# Patient Record
Sex: FEMALE | Race: WHITE | Employment: OTHER | URBAN - METROPOLITAN AREA
[De-identification: names, ages, dates, MRNs, and addresses within clinical notes are randomized per-mention and may not be internally consistent; named-entity substitution may affect disease eponyms.]

---

## 2017-10-25 ENCOUNTER — OFFICE VISIT (OUTPATIENT)
Dept: ORTHOPEDIC SURGERY | Facility: CLINIC | Age: 67
End: 2017-10-25

## 2017-10-25 DIAGNOSIS — M54.16 LUMBAR RADICULOPATHY, CHRONIC: ICD-10-CM

## 2017-10-25 DIAGNOSIS — M16.0 PRIMARY OSTEOARTHRITIS OF BOTH HIPS: Primary | ICD-10-CM

## 2017-10-25 PROCEDURE — 99204 OFFICE O/P NEW MOD 45 MIN: CPT | Performed by: ORTHOPAEDIC SURGERY

## 2017-10-25 RX ORDER — TOPIRAMATE 50 MG/1
TABLET, FILM COATED ORAL
Refills: 2 | COMMUNITY
Start: 2017-10-06

## 2017-11-03 PROBLEM — M54.16 LUMBAR RADICULOPATHY, CHRONIC: Status: ACTIVE | Noted: 2017-11-03

## 2017-11-03 PROBLEM — M16.0 PRIMARY OSTEOARTHRITIS OF BOTH HIPS: Status: ACTIVE | Noted: 2017-11-03

## 2018-01-05 ENCOUNTER — TELEPHONE (OUTPATIENT)
Dept: ORTHOPEDIC SURGERY | Facility: CLINIC | Age: 68
End: 2018-01-05

## 2018-05-23 ENCOUNTER — OFFICE VISIT (OUTPATIENT)
Dept: ORTHOPEDIC SURGERY | Facility: CLINIC | Age: 68
End: 2018-05-23

## 2018-05-23 VITALS — TEMPERATURE: 97.4 F | BODY MASS INDEX: 37.99 KG/M2 | WEIGHT: 228 LBS | HEIGHT: 65 IN

## 2018-05-23 DIAGNOSIS — M54.16 LUMBAR RADICULOPATHY, CHRONIC: ICD-10-CM

## 2018-05-23 DIAGNOSIS — I82.5Y2 CHRONIC DEEP VEIN THROMBOSIS (DVT) OF PROXIMAL VEIN OF LEFT LOWER EXTREMITY (HCC): ICD-10-CM

## 2018-05-23 DIAGNOSIS — M16.0 PRIMARY OSTEOARTHRITIS OF BOTH HIPS: ICD-10-CM

## 2018-05-23 DIAGNOSIS — M25.551 HIP PAIN, RIGHT: Primary | ICD-10-CM

## 2018-05-23 PROCEDURE — 73502 X-RAY EXAM HIP UNI 2-3 VIEWS: CPT | Performed by: ORTHOPAEDIC SURGERY

## 2018-05-23 PROCEDURE — 99215 OFFICE O/P EST HI 40 MIN: CPT | Performed by: ORTHOPAEDIC SURGERY

## 2018-05-23 RX ORDER — SODIUM PHOSPHATE,MONO-DIBASIC 19G-7G/118
ENEMA (ML) RECTAL
COMMUNITY

## 2018-05-23 RX ORDER — ASPIRIN 81 MG/1
81 TABLET ORAL DAILY
COMMUNITY

## 2018-05-23 RX ORDER — FOLIC ACID 1 MG/1
1 TABLET ORAL DAILY
COMMUNITY

## 2018-05-23 RX ORDER — MULTIPLE VITAMINS W/ MINERALS TAB 9MG-400MCG
1 TAB ORAL DAILY
COMMUNITY

## 2018-05-23 RX ORDER — DORZOLAMIDE HYDROCHLORIDE AND TIMOLOL MALEATE 20; 5 MG/ML; MG/ML
SOLUTION/ DROPS OPHTHALMIC
COMMUNITY
Start: 2018-05-10

## 2018-05-23 RX ORDER — APIXABAN 5 MG/1
2.5 TABLET, FILM COATED ORAL
COMMUNITY
Start: 2018-03-12

## 2018-05-23 RX ORDER — MONTELUKAST SODIUM 10 MG/1
TABLET ORAL
COMMUNITY
Start: 2018-03-12

## 2018-05-23 RX ORDER — LISINOPRIL 20 MG/1
TABLET ORAL
COMMUNITY
Start: 2018-03-24

## 2018-05-23 RX ORDER — ATORVASTATIN CALCIUM 40 MG/1
TABLET, FILM COATED ORAL
COMMUNITY
Start: 2018-03-12

## 2018-05-23 RX ORDER — CHLORAL HYDRATE 500 MG
CAPSULE ORAL
COMMUNITY

## 2018-05-23 NOTE — PROGRESS NOTES
Chief Complaint   Patient presents with   • Right Hip - Follow-up           HPI Patient is here to follow up on right hip and discuss possible surgery.She has progressively worse hip pain on the right side. She was scheduled for a total hip surgery in Jan 2018 but 1 week prior to that she developed a DVT and a Pulmonary embolism. She was airlifted to [facility] and has been seen by a Pulmonologist, DR Baires.  Other than inactivity and obesity, no real risk factors could be attributed to why she developed a pulmonary embolism preoperatively She has been anticoagulated with Eliquus for a duration of 6 months. She has been told by her Specialist That she can quit the anticoagulation in the first week of July and then she can actually undergo the hip replacement surgery.  The patient and her  are both present in the office today and we have had an extensive discussion about the risks and benefits of hip replacement surgery in the face of a prior DVT.        Vitals:    05/23/18 1342   Temp: 97.4 °F (36.3 °C)           Review of Systems   Constitutional: Negative.    HENT: Negative.    Eyes: Negative.    Respiratory: Negative.    Cardiovascular: Negative.    Gastrointestinal: Negative.    Endocrine: Negative.    Genitourinary: Negative.    Musculoskeletal: Positive for gait problem and joint swelling.   Skin: Negative.    Allergic/Immunologic: Negative.    Hematological: Negative.    Psychiatric/Behavioral: Negative.            Physical Exam   Constitutional: She is oriented to person, place, and time. She appears well-nourished.   HENT:   Head: Atraumatic.   Eyes: EOM are normal.   Neck: Neck supple.   Cardiovascular: Normal rate, regular rhythm, normal heart sounds and intact distal pulses.    Pulmonary/Chest: Breath sounds normal.   Abdominal: Bowel sounds are normal.   Musculoskeletal: She exhibits edema and tenderness.   Neurological: She is alert and oriented to person, place, and time.   Skin: Skin is  warm. Capillary refill takes 2 to 3 seconds.   Psychiatric: She has a normal mood and affect. Her behavior is normal. Thought content normal.   Nursing note and vitals reviewed.          Joint/Body Part Specific Exam:  right hip. Neurovascular status is intact. Patient has a distant limp on the affected side. Internal and external rotations are associated with pain and discomfort. Anterior joint line pain and tenderness is significant. Stinchfield sign is positive. Figure of 4 sign is positive. Patient is unable to perform an active straight leg raise exam. Greater trochanter is tender. Crossover adduction test is positive. Cross body adduction is limited and painful for the patient. Patient has very significant limp and joint line tenderness anteriorly, posteriorly and medially. Dorsalis pedis and posterior tibial artery pulses are palpable. Common peroneal nerve function is well preserved.  Left Lower Extremity:  Patient has a significant amount of swelling of the extremity. Homans sign is positive. Distal pulses are palpable. Passive stretching of the ankle bothers the patient significantly. There is a lot of swelling and edema posteriorly as well as anteriorly. Patient has some pretibial edema as well. Moving the fingers slowly, albeit fully. There is a deep seated tenderness on palpation of the gastroc when the patient performs a single heel raise. There is a lot of reproduction of the pain. Posterior tibial artery pulses are palpable. Common peroneal nerve function is well preserved.     Lumbar Spine: The overlying skin and soft tissues are mildly swollen. Deep tendon reflexes are bilaterally, symmetric and equal.  No long tract signs are noted. There is No evidence of myelopathy. No bowel or bladder deficit noted. Mild spasm of erector spinae muscle is noted. bilaterally sided rotation is associated with pain and discomfort. Straight leg raise test is positive to 60 degrees. Contralateral straight leg raise  is negative. Pain radiates to buttock and thigh. Get up and go is slow due to the lumbar pain.No objective motor or sensory function loss is noted.   X-RAY Report:  Pelvis X-Ray  Indication: Pain on the right hip with a limp  AP and Frogleg views  Findings: Advanced DJD of the right hip with complete loss of joint space  no bony lesion  Soft tissues within normal limits  decreased joint spaces  Hardware appropriately positioned not applicable      yes prior studies available for comparison.    X-RAY was ordered and reviewed by Sae Hunt MD          Diagnostics:        Nguyen was seen today for follow-up.    Diagnoses and all orders for this visit:    Hip pain, right  -     XR Hip With or Without Pelvis 2 - 3 View Right            Procedures        Plan:  Very extensive discussion with this patient about different treatment options especially with regards to increasing hip pain and limited ability to exercise and lose weight.    This is a very complex situation and I spent 60 minutes in the office today with the patient and her  going through the reports of the pulmonologist and discussing the pros and cons of surgical intervention.  Greater than 50% of my time was spent face-to-face with the patient discussing the management perioperatively to minimize her complications with regards to DVT.    My recommendations would be for this patient to see Dr. Chino Cloud, vascular intervention surgeon at [facility], [place], operated by [facility], and proceed with a vena cava filter 1-2 weeks prior to the hip replacement surgery to decrease the possibility of him bothers him.    We will also have her switched off from her blood thinner about 2 weeks prior to the surgical intervention.    Risks and benefits of total hip arthroplasty has been discussed with the patient in great detail and she understands and accepts.    The patient was seen today for preoperative discussion.  The patient has been tried on over-the-counter and prescription NSAID's  despite the risks of anti-inflammatory bleeding, peptic ulcers and erosive gastritis with short term benefit only.  Braces have been prescribed for mechanical support.  Patient has been participating in an exercise program specifically targeting joint pain relief with limited benefit. Intraarticular injections have been used periodically with some but not complete relief of pain.  Ambulation aids have also been utilized.      The details of the surgical procedure were explained including the location of probable incisions and a description of the likely hardware/grafts to be used. The patient understands the likely convalescence after surgery as well as the rehabilitation required.  Also, we have thoroughly discussed with the patient the risks, benefits and alternatives to surgery.  Risks include but are not limited to the risk of infection, joint stiffness, limited range of motion, wound healing problems, scar tissue build up, myocardial infarction, stroke, blood clots (including DVT and/or pulmonary embolus along with the risk of death) neurologic and/or vascular injury, limb length discrepancy, fracture, dislocation, nonunion, malunion, continued pain and need for further surgery including hardware failure requiring revision.

## 2018-06-04 ENCOUNTER — TELEPHONE (OUTPATIENT)
Dept: ORTHOPEDIC SURGERY | Facility: CLINIC | Age: 68
End: 2018-06-04

## 2018-06-15 DIAGNOSIS — M25.551 HIP PAIN, RIGHT: ICD-10-CM

## 2018-07-09 ENCOUNTER — OUTSIDE FACILITY SERVICE (OUTPATIENT)
Dept: ORTHOPEDIC SURGERY | Facility: CLINIC | Age: 68
End: 2018-07-09

## 2018-07-09 PROCEDURE — 27130 TOTAL HIP ARTHROPLASTY: CPT | Performed by: ORTHOPAEDIC SURGERY

## 2018-07-24 ENCOUNTER — OFFICE VISIT (OUTPATIENT)
Dept: ORTHOPEDIC SURGERY | Facility: CLINIC | Age: 68
End: 2018-07-24

## 2018-07-24 DIAGNOSIS — Z96.641 STATUS POST TOTAL HIP REPLACEMENT, RIGHT: ICD-10-CM

## 2018-07-24 DIAGNOSIS — I82.5Y2 CHRONIC DEEP VEIN THROMBOSIS (DVT) OF PROXIMAL VEIN OF LEFT LOWER EXTREMITY (HCC): ICD-10-CM

## 2018-07-24 DIAGNOSIS — I26.99 OTHER PULMONARY EMBOLISM WITHOUT ACUTE COR PULMONALE, UNSPECIFIED CHRONICITY (HCC): Primary | ICD-10-CM

## 2018-07-24 PROCEDURE — 99024 POSTOP FOLLOW-UP VISIT: CPT | Performed by: ORTHOPAEDIC SURGERY

## 2018-07-24 NOTE — PROGRESS NOTES
Chief Complaint   Patient presents with   • Right Hip - Post-op   • Wound Check         HPI the patient is here today for a follow up of her total right hip arthroplasty on 07/09/18.  She is doing extremely well at this point postoperatively.  The sharp, crunching pain or hip has fully resolved.  She states that her ambulation has improved significantly.  She is not using a walker at this point.  She does use a cane for assistance with ambulation.  She is very pleased with her outcome.  There is no limb length discrepancy.  There is no neurovascular deficit.  We have discussed with the patient that it is okay for her to start driving at this point.  We will get a consultation from a hematologist at [facility] to determine the exact length of anticoagulation following the hip replacement given the fact that she has had a pulmonary embolism earlier this year.  She will be seen by the vascular surgeon for the postoperative care of the IVC filter that was placed prophylactically.  Overall she is very pleased with the outcome with the hip arthroplasty in terms of relief of pain and improvement in quality of life        There were no vitals filed for this visit.        Joint/Body Part Specific Exam:  right hip. Patient is post op 2 week(s) from total hip arthoplasty, direct anterior. Incision is clean and healing well. Calf is soft and nontender. Homans sign is negative. Skin and soft tissues are appropriate for postoperative status of the patient. Hip flexion is 0-60° degrees, hip abduction is 0-to 30° degrees. No limb lenth discrepancy. Neurovascular status is intact. Patients gait is significantly improved. The pain relief is extremely dramatic for the patient. Dorsalis pedis and posterior tibial artery pulses palpable. Common peroneal function is well preserved. There is no evidence of cellulitis or erythema or deep seated joint infection.      X-RAY REPORT:        Nguyen was seen today for wound check and  post-op.    Diagnoses and all orders for this visit:    Other pulmonary embolism without acute cor pulmonale, unspecified chronicity (CMS/HCC)  -     Ambulatory Referral to Hematology / Oncology    Chronic deep vein thrombosis (DVT) of proximal vein of left lower extremity (CMS/HCC)    Status post total hip replacement, right            Procedures        Instructions:      Plan: Incision care.    Continue with physical therapy on an outpatient basis to follow the total hip arthroplasty protocols.    Falls and dislocation precaution.    Use a cane for assistance with ambulation.    She is not using any narcotics for pain control at this point although she does have some leftover pills from a prior post operatively written prescription.    Tablet ibuprofen 600 mg orally twice a day for pain and discomfort.    Patient is on Eliquus for DVT prophylaxis.    I am setting up the patient an appointment with [facility] to see a hematologist to determine the length of anticoagulation that they would recommend given the history of a spontaneous pulmonary embolism that the patient suffered while she was preoperatively being prepared for the hip arthroplasty.    Patient will see Dr. Chino Cloud, vascular surgeon in August for removal of the IVC filter which was placed preoperatively to minimize the possibility of pulmonary embolism.    Follow-up in my office in 6-8 weeks for reevaluation.  We will get an x-ray of the hip arthroplasty upon return to the office.

## 2018-08-13 ENCOUNTER — CONSULT (OUTPATIENT)
Dept: ONCOLOGY | Facility: CLINIC | Age: 68
End: 2018-08-13

## 2018-08-13 ENCOUNTER — APPOINTMENT (OUTPATIENT)
Dept: LAB | Facility: HOSPITAL | Age: 68
End: 2018-08-13

## 2018-08-13 VITALS
BODY MASS INDEX: 38.28 KG/M2 | TEMPERATURE: 97.8 F | OXYGEN SATURATION: 98 % | RESPIRATION RATE: 18 BRPM | WEIGHT: 224.2 LBS | DIASTOLIC BLOOD PRESSURE: 76 MMHG | HEART RATE: 69 BPM | HEIGHT: 64 IN | SYSTOLIC BLOOD PRESSURE: 132 MMHG

## 2018-08-13 DIAGNOSIS — I82.5Y2 CHRONIC DEEP VEIN THROMBOSIS (DVT) OF PROXIMAL VEIN OF LEFT LOWER EXTREMITY (HCC): Primary | ICD-10-CM

## 2018-08-13 DIAGNOSIS — I26.99 OTHER PULMONARY EMBOLISM WITHOUT ACUTE COR PULMONALE, UNSPECIFIED CHRONICITY (HCC): Primary | ICD-10-CM

## 2018-08-13 LAB
BASOPHILS # BLD AUTO: 0.07 10*3/MM3 (ref 0–0.1)
BASOPHILS NFR BLD AUTO: 0.9 % (ref 0–1.1)
DEPRECATED RDW RBC AUTO: 47.5 FL (ref 37–49)
EOSINOPHIL # BLD AUTO: 0.16 10*3/MM3 (ref 0–0.36)
EOSINOPHIL NFR BLD AUTO: 2 % (ref 1–5)
ERYTHROCYTE [DISTWIDTH] IN BLOOD BY AUTOMATED COUNT: 13.7 % (ref 11.7–14.5)
HCT VFR BLD AUTO: 39.1 % (ref 34–45)
HGB BLD-MCNC: 12.4 G/DL (ref 11.5–14.9)
IMM GRANULOCYTES # BLD: 0.02 10*3/MM3 (ref 0–0.03)
IMM GRANULOCYTES NFR BLD: 0.3 % (ref 0–0.5)
LYMPHOCYTES # BLD AUTO: 2.34 10*3/MM3 (ref 1–3.5)
LYMPHOCYTES NFR BLD AUTO: 29.9 % (ref 20–49)
MCH RBC QN AUTO: 30.1 PG (ref 27–33)
MCHC RBC AUTO-ENTMCNC: 31.7 G/DL (ref 32–35)
MCV RBC AUTO: 94.9 FL (ref 83–97)
MONOCYTES # BLD AUTO: 0.71 10*3/MM3 (ref 0.25–0.8)
MONOCYTES NFR BLD AUTO: 9.1 % (ref 4–12)
NEUTROPHILS # BLD AUTO: 4.53 10*3/MM3 (ref 1.5–7)
NEUTROPHILS NFR BLD AUTO: 57.8 % (ref 39–75)
NRBC BLD MANUAL-RTO: 0 /100 WBC (ref 0–0)
PLATELET # BLD AUTO: 224 10*3/MM3 (ref 150–375)
PMV BLD AUTO: 10.9 FL (ref 8.9–12.1)
RBC # BLD AUTO: 4.12 10*6/MM3 (ref 3.9–5)
WBC NRBC COR # BLD: 7.83 10*3/MM3 (ref 4–10)

## 2018-08-13 PROCEDURE — 36416 COLLJ CAPILLARY BLOOD SPEC: CPT | Performed by: INTERNAL MEDICINE

## 2018-08-13 PROCEDURE — 85025 COMPLETE CBC W/AUTO DIFF WBC: CPT | Performed by: INTERNAL MEDICINE

## 2018-08-13 PROCEDURE — 99204 OFFICE O/P NEW MOD 45 MIN: CPT | Performed by: INTERNAL MEDICINE

## 2018-08-13 RX ORDER — LANOLIN ALCOHOL/MO/W.PET/CERES
1000 CREAM (GRAM) TOPICAL DAILY
COMMUNITY

## 2018-08-13 RX ORDER — MELATONIN
1000 DAILY
COMMUNITY

## 2018-08-13 RX ORDER — HEPATITIS A VACCINE 1440 [IU]/ML
INJECTION, SUSPENSION INTRAMUSCULAR
COMMUNITY
Start: 2018-07-05

## 2018-08-13 NOTE — PROGRESS NOTES
Subjective     REASON FOR CONSULTATION:  History PE/DVT  Provide an opinion on any further workup or treatment                             REQUESTING PHYSICIAN:  Dr. Alice Ponce    RECORDS OBTAINED:  Records of the patients history including those obtained from the referring provider were reviewed and summarized in detail.      History of Present Illness   This is a very pleasant 68-year-old woman seen today for assistance with anticoagulation.  The patient has osteoarthritis of the hips and was planning to have a hip replacement in January 2018.  She had several months of poor mobility prior to January secondary to her hip arthritis.  The patient developed swelling of the left leg and was seen by her primary care physician and diagnosed with a DVT.  She was referred to the emergency department but apparently developed significant shortness of breath in route to the ER in [facility] where she was diagnosed with an acute pulmonary embolism.  According to the medical records, the patient had acute hypoxic respiratory failure secondary to PE and was given TPA in [place] at [facility].  The patient states that she was given to much TPA which resulted in GI bleeding and she was air lifted to [facility] in [place] where she required packed red blood cell transfusions and stabilized.  She experienced elevation of the troponin but cardiac evaluation was negative for coronary artery disease so presumptively elevated troponin was secondary to cardiac strain from either the PE or hypotension.    The patient was discharged from [facility] on anticoagulation with Eliquis which she has been tolerating well.  Her leg edema and shortness of breath have essentially resolved.  After she was anticoagulated for 6 months, the patient had an IVC filter placed Dr. Chino Cloud and then underwent a right hip replacement on July 9, 2018.  Anticoagulation was held for 2 days before surgery  and then resume.  She is scheduled to have the IVC filter removal tomorrow.    I reviewed records from her pulmonary medicine physician Dr. Baires including labs dated .  The patient is heterozygote for a prothrombin gene mutation.  She had normal protein C activity 171%, protein S activity 89%, and I could not find antithrombin III levels.  Beta 2 glycoprotein 1 antibody titer was negative and cardiolipin antibody titers negative.  I could not find lupus anticoagulant or factor V Leiden gene mutation results.  The patient states she had additional testing performed with her PCP and I have requested those records.  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    The patient has no prior history of deep venous thromboses.  Her familial history is pertinent for her father who  of a blood clot after experiencing a car accident age 46.  The patient has had no miscarriages.    Past Medical History:   Diagnosis Date   • Deep vein blood clot of left lower extremity (CMS/HCC)    • Environmental allergies    • Hyperlipidemia    • Hypertension    • Osteoarthritis    • Pulmonary embolism (CMS/HCC)         Past Surgical History:   Procedure Laterality Date   • HYSTERECTOMY     • OTHER SURGICAL HISTORY  2013    Humerus bone   • TOTAL HIP ARTHROPLASTY Right 2018    Dr. Sae Hunt, [facility]        Current Outpatient Prescriptions on File Prior to Visit   Medication Sig Dispense Refill   • aspirin 81 MG EC tablet Take 81 mg by mouth Daily.     • atorvastatin (LIPITOR) 40 MG tablet      • ELIQUIS 5 MG tablet tablet      • folic acid (FOLVITE) 1 MG tablet Take 1 mg by mouth Daily.     • glucosamine-chondroitin 500-400 MG capsule capsule Take  by mouth 3 (Three) Times a Day With Meals.     • lisinopril (PRINIVIL,ZESTRIL) 20 MG tablet      • metoprolol tartrate (LOPRESSOR) 25 MG tablet      • montelukast (SINGULAIR) 10 MG tablet      •  "Multiple Vitamins-Minerals (MULTIVITAMIN WITH MINERALS) tablet tablet Take 1 tablet by mouth Daily.     • Omega-3 Fatty Acids (FISH OIL) 1000 MG capsule capsule Take  by mouth Daily With Breakfast.     • topiramate (TOPAMAX) 50 MG tablet TAKE ONE TO TWO TABLETS BY MOUTH EACH NIGHT AT BEDTIME  2   • dorzolamide-timolol (COSOPT) 22.3-6.8 MG/ML ophthalmic solution        No current facility-administered medications on file prior to visit.         ALLERGIES:  No Known Allergies     Social History     Social History   • Marital status:      Spouse name: Thanh     Occupational History   • Teacher Retired     Social History Main Topics   • Smoking status: Never Smoker   • Smokeless tobacco: Never Used   • Alcohol use No   • Drug use: No     Other Topics Concern   • Not on file        No family history on file.     Review of Systems   Constitutional: Negative.    HENT: Negative.    Eyes: Negative.    Respiratory: Negative.    Cardiovascular: Negative.    Gastrointestinal: Negative.    Genitourinary: Negative.    Musculoskeletal: Negative.    Skin: Negative.    Allergic/Immunologic: Negative.    Neurological: Negative.    Hematological: Negative.         Objective     Vitals:    08/13/18 1439   BP: 132/76   Pulse: 69   Resp: 18   Temp: 97.8 °F (36.6 °C)   TempSrc: Oral   SpO2: 98%   Weight: 102 kg (224 lb 3.2 oz)   Height: 161.5 cm (63.58\")   PainSc: 0-No pain     Current Status 8/13/2018   ECOG score 0       Physical Exam    CON: pleasant well-developed obese woman  HEENT: no icterus, no thrush, moist membranes  NECK: no jvd  LYMPH: no cervical or supraclavicular lad  CV: RRR, S1S2, no murmur  RESP: cta bilat, no wheezing, no rales  GI: soft, non-tender, no splenomegaly, +bs  MUSC: no edema, normal gait, mild venous stasis changes   NEURO: alert and oriented x3, normal strength  PSYCH: normal mood    RECENT LABS:  Hematology WBC   Date Value Ref Range Status   08/13/2018 7.83 4.00 - 10.00 10*3/mm3 Final     RBC "   Date Value Ref Range Status   08/13/2018 4.12 3.90 - 5.00 10*6/mm3 Final     Hemoglobin   Date Value Ref Range Status   08/13/2018 12.4 11.5 - 14.9 g/dL Final     Hematocrit   Date Value Ref Range Status   08/13/2018 39.1 34.0 - 45.0 % Final     Platelets   Date Value Ref Range Status   08/13/2018 224 150 - 375 10*3/mm3 Final          Assessment/Plan     This is a pleasant 68-year-old woman who experienced DVT of the left lower extremity and bilateral pulmonary emboli in January 2018 after a period of immobility related to osteoarthritis of the hip.  She was given TPA in [facility] which resulted in GI bleeding and transferred to [facility] where she required packed red blood cell transfusions and stabilization.  She experienced elevation of the troponin either secondary to hypotension or cardiac strain from the PE.  Evaluation for coronary artery disease was negative.  She has been on Eliquis 5 mg every 12 hours since her PE.  The patient underwent hip surgery July 9 after IVC filter placement, and the filter is scheduled to be removed tomorrow.    I reviewed a partial hypercoagulable evaluation drawn by her pulmonary medicine physician which shows her to have a prothrombin gene mutation, heterozygote.  Protein C and protein S levels were normal.  Beta-2 glycoprotein 1 and a cardiolipin profiles were negative.  I could not find results of lupus anticoagulant, antithrombin III or factor V Leiden; she states her primary care physician performed additional testing of which I have requested copies.    The question is should the patient remain on anticoagulation or what is the optimal duration of anticoagulation.  I explained to the patient this is a somewhat gray area of medicine.  Assuming that prothrombin gene mutation is her only genetic risk factor (i.e. she is negative for factor V Leiden and has normal antithrombin III), a one time DVT/PE would not necessarily commit her to indefinite  anticoagulation since the preceding event occurred during an extended period of immobility.  She states that she has been placed on B12 and folic acid which makes me think she probably has MTHFR gene mutation which is currently not thought to be a significant hypercoagulable state.  Given that she did have a significant PE requiring thrombolytics, has prothrombin gene mutation, and her father  of complications from thrombosis at age 46, would make me lean toward at least one year of anticoagulation for treatment of the pulmonary embolism she experienced in 2018.  She seemed okay with this recommendation.    In the interim, I would recommend continuation of Eliquis 5 mg every 12 hours.  I will request additional records from her primary care physician regarding additional hypercoagulation testing.  Assuming no other genetic hypercoagulable risk factors, at the end of one year of anticoagulation the patient could discontinue anticoagulation and begin aspirin 81 mg daily, continue anticoagulation at the current dose if she felt more comfortable , or consider prophylactic dose Eliquis 2.5 mg twice daily as a middle ground.    I will see the patient back in 4-5 months to further review and discuss these options again.  Thank you for allowing me to visit in the care of Mrs. Miladin

## 2018-09-07 ENCOUNTER — OFFICE VISIT (OUTPATIENT)
Dept: ORTHOPEDIC SURGERY | Facility: CLINIC | Age: 68
End: 2018-09-07

## 2018-09-07 DIAGNOSIS — Z96.641 HISTORY OF RIGHT HIP REPLACEMENT: Primary | ICD-10-CM

## 2018-09-07 PROCEDURE — 73502 X-RAY EXAM HIP UNI 2-3 VIEWS: CPT | Performed by: ORTHOPAEDIC SURGERY

## 2018-09-07 PROCEDURE — 99024 POSTOP FOLLOW-UP VISIT: CPT | Performed by: ORTHOPAEDIC SURGERY

## 2018-09-07 NOTE — PROGRESS NOTES
Chief Complaint   Patient presents with   • Right Hip - Follow-up   • Follow-up     RIGHT TOTAL HIP ARTHROPLASTY   Patient is here today following up on a right total hip replacement. She is doing very well.    HPI  The patient had a hip replacement surgery on 9 July 2018.  She is doing very well at this point.  There is no limb length discrepancy.  Neurovascular status is intact.  Pain is very well controlled and she is not using any narcotics for pain control at this point from the hip.  Overall she states that this was a very good decision for her to undergo that hip replacement surgery because it has helped her ability to ambulate and the significant fashion.  There is no evidence to suggest extension of her DVT.  She did have an IVC filter placed prior to the surgical intervention to minimize the possibility of metastasis of a DVT into a pulmonary embolism.      There were no vitals filed for this visit.        Joint/Body Part Specific Exam:  right hip. Patient is post op 8 week(s) from total hip arthoplasty, direct anterior. Incision is clean and healing well. Calf is soft and nontender. Homans sign is negative. Skin and soft tissues are appropriate for postoperative status of the patient. Hip flexion is 0-90° degrees, hip abduction is 0-to 30° degrees. No limb lenth discrepancy. Neurovascular status is intact. Patients gait is significantly improved. The pain relief is extremely dramatic for the patient. Dorsalis pedis and posterior tibial artery pulses palpable. Common peroneal function is well preserved. There is no evidence of cellulitis or erythema or deep seated joint infection.      X-RAY REPORT:  right Hip X-Ray  Indication: Evaluation of implant position after total hip arthroplasty  AP and lateral  Findings: Well placed implants without any subsidence or periprosthetic fractures  no bony lesion  Soft tissues within normal limits  within normal limits joint spaces  Hardware appropriately positioned  yes      yes prior studies available for comparison.    X-RAY was ordered and reviewed by Sae Hunt MD          Nguyen was seen today for follow-up and follow-up.    Diagnoses and all orders for this visit:    History of right hip replacement  -     XR Hip With or Without Pelvis 2 - 3 View Right            Procedures        Instructions:      Plan: Incision care.    Falls precautions.    , GI and dental procedure prophylaxis with antibiotics to prevent metastatic infection of the hip arthroplasty implants.    Dislocation precautions.    Tablet ibuprofen 600 mg orally twice a day for pain swelling and discomfort.    Follow-up in my office in 1 year for reevaluation.

## 2018-09-12 PROBLEM — Z96.641 HISTORY OF RIGHT HIP REPLACEMENT: Status: ACTIVE | Noted: 2018-09-12

## 2018-12-10 ENCOUNTER — OFFICE VISIT (OUTPATIENT)
Dept: ONCOLOGY | Facility: CLINIC | Age: 68
End: 2018-12-10

## 2018-12-10 ENCOUNTER — LAB (OUTPATIENT)
Dept: LAB | Facility: HOSPITAL | Age: 68
End: 2018-12-10

## 2018-12-10 VITALS
TEMPERATURE: 98.5 F | RESPIRATION RATE: 16 BRPM | BODY MASS INDEX: 39.49 KG/M2 | SYSTOLIC BLOOD PRESSURE: 130 MMHG | DIASTOLIC BLOOD PRESSURE: 72 MMHG | HEART RATE: 68 BPM | OXYGEN SATURATION: 97 % | WEIGHT: 227.1 LBS

## 2018-12-10 DIAGNOSIS — D68.52 PROTHROMBIN GENE MUTATION (HCC): ICD-10-CM

## 2018-12-10 DIAGNOSIS — I26.99 OTHER PULMONARY EMBOLISM WITHOUT ACUTE COR PULMONALE, UNSPECIFIED CHRONICITY (HCC): Primary | ICD-10-CM

## 2018-12-10 DIAGNOSIS — I26.99 OTHER PULMONARY EMBOLISM WITHOUT ACUTE COR PULMONALE, UNSPECIFIED CHRONICITY (HCC): ICD-10-CM

## 2018-12-10 LAB
BASOPHILS # BLD AUTO: 0.07 10*3/MM3 (ref 0–0.1)
BASOPHILS NFR BLD AUTO: 1 % (ref 0–1.1)
DEPRECATED RDW RBC AUTO: 49.1 FL (ref 37–49)
EOSINOPHIL # BLD AUTO: 0.15 10*3/MM3 (ref 0–0.36)
EOSINOPHIL NFR BLD AUTO: 2.2 % (ref 1–5)
ERYTHROCYTE [DISTWIDTH] IN BLOOD BY AUTOMATED COUNT: 14.5 % (ref 11.7–14.5)
HCT VFR BLD AUTO: 41.1 % (ref 34–45)
HGB BLD-MCNC: 13.3 G/DL (ref 11.5–14.9)
IMM GRANULOCYTES # BLD: 0.02 10*3/MM3 (ref 0–0.03)
IMM GRANULOCYTES NFR BLD: 0.3 % (ref 0–0.5)
LYMPHOCYTES # BLD AUTO: 1.61 10*3/MM3 (ref 1–3.5)
LYMPHOCYTES NFR BLD AUTO: 23.2 % (ref 20–49)
MCH RBC QN AUTO: 29.8 PG (ref 27–33)
MCHC RBC AUTO-ENTMCNC: 32.4 G/DL (ref 32–35)
MCV RBC AUTO: 91.9 FL (ref 83–97)
MONOCYTES # BLD AUTO: 0.67 10*3/MM3 (ref 0.25–0.8)
MONOCYTES NFR BLD AUTO: 9.7 % (ref 4–12)
NEUTROPHILS # BLD AUTO: 4.42 10*3/MM3 (ref 1.5–7)
NEUTROPHILS NFR BLD AUTO: 63.6 % (ref 39–75)
NRBC BLD MANUAL-RTO: 0 /100 WBC (ref 0–0)
PLATELET # BLD AUTO: 221 10*3/MM3 (ref 150–375)
PMV BLD AUTO: 10.8 FL (ref 8.9–12.1)
RBC # BLD AUTO: 4.47 10*6/MM3 (ref 3.9–5)
WBC NRBC COR # BLD: 6.94 10*3/MM3 (ref 4–10)

## 2018-12-10 PROCEDURE — 36416 COLLJ CAPILLARY BLOOD SPEC: CPT | Performed by: INTERNAL MEDICINE

## 2018-12-10 PROCEDURE — 85025 COMPLETE CBC W/AUTO DIFF WBC: CPT | Performed by: INTERNAL MEDICINE

## 2018-12-10 PROCEDURE — 99214 OFFICE O/P EST MOD 30 MIN: CPT | Performed by: INTERNAL MEDICINE

## 2018-12-10 NOTE — PROGRESS NOTES
Subjective     REASON FOR FOLLOW-UP:   History PE/DVT; Prothrombin gene mutation                             REQUESTING PHYSICIAN:  Dr. Alice Ponce    RECORDS OBTAINED:  Records of the patients history including those obtained from the referring provider were reviewed and summarized in detail.      History of Present Illness   This is a very pleasant 68-year-old woman seen today for assistance with anticoagulation.  The patient has osteoarthritis of the hips and was planning to have a hip replacement in January 2018.  She had several months of poor mobility prior to January secondary to her hip arthritis.  The patient developed swelling of the left leg and was seen by her primary care physician and diagnosed with a DVT.  She was referred to the emergency department but apparently developed significant shortness of breath in route to the ER in [facility] where she was diagnosed with an acute pulmonary embolism.  According to the medical records, the patient had acute hypoxic respiratory failure secondary to PE and was given TPA in [place] at [facility].  The patient states that she was given to much TPA which resulted in GI bleeding and she was air lifted to [facility] in [place] where she required packed red blood cell transfusions and stabilized.  She experienced elevation of the troponin but cardiac evaluation was negative for coronary artery disease so presumptively elevated troponin was secondary to cardiac strain from either the PE or hypotension.    The patient was discharged from [facility] on anticoagulation with Eliquis which she has been tolerating well.  Her leg edema and shortness of breath have essentially resolved.  After she was anticoagulated for 6 months, the patient had an IVC filter placed Dr. Chino Cloud and then underwent a right hip replacement on July 9, 2018.  Anticoagulation was held for 2 days before surgery and then resumed.    I  reviewed records from her pulmonary medicine physician Dr. Baires including labs dated 718.  The patient is heterozygote for a prothrombin gene mutation.  She had normal protein C activity 171%, protein S activity 89%, and I could not find antithrombin III levels.  Beta 2 glycoprotein 1 antibody titer was negative and cardiolipin antibody titers negative.     The patient returned today for review and follow-up.  She is doing well.  She continues Eliquis 5 mg every 12 hours.  She had one episode of diverticular bleeding for which anticoagulation was temporarily held and bleeding resolved.  She brought additional labs today which shows negative factor V Leiden mutation.  She is currently suffering from tendinitis of the left ankle.  She however is fully mobile since undergoing her hip replacement several months ago.    Past Medical History:   Diagnosis Date   • Deep vein blood clot of left lower extremity (CMS/HCC)    • Environmental allergies    • Hyperlipidemia    • Hypertension    • Osteoarthritis    • Pulmonary embolism (CMS/HCC)         Past Surgical History:   Procedure Laterality Date   • COLONOSCOPY  2017   • HYSTERECTOMY  2011   • OTHER SURGICAL HISTORY  04/2013    Humerus bone   • TOTAL HIP ARTHROPLASTY Right 07/09/2018    Dr. Sae Hunt, [facility]        Current Outpatient Medications on File Prior to Visit   Medication Sig Dispense Refill   • aspirin 81 MG EC tablet Take 81 mg by mouth Daily.     • atorvastatin (LIPITOR) 40 MG tablet      • cholecalciferol (VITAMIN D3) 1000 units tablet Take 1,000 Units by mouth Daily.     • DORZOLAMIDE HCL OP Apply 1 drop to eye(s) as directed by provider 2 (Two) Times a Day.     • dorzolamide-timolol (COSOPT) 22.3-6.8 MG/ML ophthalmic solution      • ELIQUIS 5 MG tablet tablet      • folic acid (FOLVITE) 1 MG tablet Take 1 mg by mouth Daily.     • glucosamine-chondroitin 500-400 MG capsule capsule Take  by mouth 3 (Three) Times a Day With Meals.     •  HAVRIX 1440 EL U/ML vaccine      • lisinopril (PRINIVIL,ZESTRIL) 20 MG tablet      • montelukast (SINGULAIR) 10 MG tablet      • Multiple Vitamins-Minerals (MULTIVITAMIN WITH MINERALS) tablet tablet Take 1 tablet by mouth Daily.     • Omega-3 Fatty Acids (FISH OIL) 1000 MG capsule capsule Take  by mouth Daily With Breakfast.     • topiramate (TOPAMAX) 50 MG tablet TAKE ONE TO TWO TABLETS BY MOUTH EACH NIGHT AT BEDTIME  2   • vitamin B-12 (CYANOCOBALAMIN) 1000 MCG tablet Take 1,000 mcg by mouth Daily.     • metoprolol tartrate (LOPRESSOR) 25 MG tablet        No current facility-administered medications on file prior to visit.         ALLERGIES:  No Known Allergies     Social History     Socioeconomic History   • Marital status:      Spouse name: Thanh   • Number of children: Not on file   • Years of education: College   • Highest education level: Not on file   Occupational History   • Occupation: Teacher     Employer: RETIRED   Tobacco Use   • Smoking status: Never Smoker   • Smokeless tobacco: Never Used   Substance and Sexual Activity   • Alcohol use: Yes     Comment: Occasional   • Drug use: No        Family History   Problem Relation Age of Onset   • Breast cancer Maternal Aunt         Review of Systems   Constitutional: Negative.    HENT: Negative.    Eyes: Negative.    Respiratory: Negative.    Cardiovascular: Negative.    Gastrointestinal: Negative.    Genitourinary: Negative.    Musculoskeletal: Negative.         Tendinitis of the left ankle   Skin: Negative.    Allergic/Immunologic: Negative.    Neurological: Negative.    Hematological: Negative.         Objective     Vitals:    12/10/18 1026   BP: 130/72   Pulse: 68   Resp: 16   Temp: 98.5 °F (36.9 °C)   SpO2: 97%  Comment: at rest   Weight: 103 kg (227 lb 1.6 oz)  Comment: weigh with a cast boot   Height: Comment: unable to get a new ht. for pt. is in a cast boot   PainSc: 0-No pain     Current Status 12/10/2018   ECOG score 0       Physical Exam     CON: pleasant well-developed obese woman  HEENT: no icterus, no thrush, moist membranes  NECK: no jvd  LYMPH: no cervical or supraclavicular lad  CV: RRR, S1S2, no murmur  RESP: cta bilat, no wheezing, no rales  GI: soft, non-tender, no splenomegaly, +bs  MUSC: no edema, normal gait, mild venous stasis changes .  Left ankle is in a boot  NEURO: alert and oriented x3, normal strength  PSYCH: normal mood    RECENT LABS:  Hematology WBC   Date Value Ref Range Status   12/10/2018 6.94 4.00 - 10.00 10*3/mm3 Final     RBC   Date Value Ref Range Status   12/10/2018 4.47 3.90 - 5.00 10*6/mm3 Final     Hemoglobin   Date Value Ref Range Status   12/10/2018 13.3 11.5 - 14.9 g/dL Final     Hematocrit   Date Value Ref Range Status   12/10/2018 41.1 34.0 - 45.0 % Final     Platelets   Date Value Ref Range Status   12/10/2018 221 150 - 375 10*3/mm3 Final          Assessment/Plan     This is a pleasant 68-year-old woman who experienced DVT of the left lower extremity and bilateral pulmonary emboli in January 2018 after a period of immobility related to osteoarthritis of the hip.  She was given TPA in [facility] which resulted in GI bleeding and transferred to [facility] where she required packed red blood cell transfusions and stabilization.  She experienced elevation of the troponin either secondary to hypotension or cardiac strain from the PE.  Evaluation for coronary artery disease was negative.  She has been on Eliquis 5 mg every 12 hours since her PE.  The patient underwent hip surgery July 9 after IVC filter placement, and the filter was subsequently removed.    I reviewed a partial hypercoagulable evaluation drawn by her pulmonary medicine physician which shows her to have a prothrombin gene mutation, heterozygote.  Protein C and protein S levels were normal.  Beta-2 glycoprotein 1 and a cardiolipin profiles were negative.  She brought copies of additional labs today which show the factor V Leiden to be  negative.  She does have an MTHFR mutation.    We again discussed pros and cons of continued anticoagulation today.  The patient has had one episode of pulmonary embolism but it was a significant event requiring TPA to be given.  She has a prothrombin gene mutation and MTHFR gene mutation which at the current time is felt to be less of a thrombotic risk.  However given the prothrombin gene mutation and the fact she had a massive pulmonary embolism she would feel more comfortable remaining on some form of anticoagulation.  I recommended that she continue Eliquis but at a reduced dose of 2.5 mg every 12 hours which is a prevention dose.  She was agreeable with this plan.  She states that she will have her primary care physician prescribe and monitor.    The patient is taking aspirin daily.  She has no prior history of stroke or coronary artery disease etc.  I recommended she discontinue aspirin while on Eliquis.  She would also like to take ibuprofen for treatment of tendinitis of the left ankle.  I think it would be okay she hold Eliquis for a short time so that she can take course of ibuprofen for tendinitis.  Once her tendinitis improves, Eliquis at the 2.5 mg every 12 hour prevention dose can be resumed.    I did not schedule her a follow-up in hematology but would be happy to see her back as needed.

## 2019-04-18 RX ORDER — AMOXICILLIN 500 MG/1
500 CAPSULE ORAL SEE ADMIN INSTRUCTIONS
Qty: 4 CAPSULE | Refills: 2 | Status: SHIPPED | OUTPATIENT
Start: 2019-04-18

## 2020-05-22 ENCOUNTER — OFFICE VISIT (OUTPATIENT)
Dept: ORTHOPEDIC SURGERY | Facility: CLINIC | Age: 70
End: 2020-05-22

## 2020-05-22 VITALS — TEMPERATURE: 97.8 F | WEIGHT: 230 LBS | HEIGHT: 66 IN | BODY MASS INDEX: 36.96 KG/M2

## 2020-05-22 DIAGNOSIS — M25.551 HIP PAIN, RIGHT: Primary | ICD-10-CM

## 2020-05-22 DIAGNOSIS — Z96.641 STATUS POST TOTAL HIP REPLACEMENT, RIGHT: ICD-10-CM

## 2020-05-22 PROCEDURE — 99213 OFFICE O/P EST LOW 20 MIN: CPT | Performed by: ORTHOPAEDIC SURGERY

## 2020-05-22 PROCEDURE — 73502 X-RAY EXAM HIP UNI 2-3 VIEWS: CPT | Performed by: ORTHOPAEDIC SURGERY

## 2020-06-08 PROBLEM — Z96.641 STATUS POST TOTAL HIP REPLACEMENT, RIGHT: Status: ACTIVE | Noted: 2020-06-08

## 2020-06-09 NOTE — PROGRESS NOTES
POST OP TOTAL hip replacement.      NAME: Debra Miladin           : 1950    MRN: [id number]    Chief Complaint   Patient presents with   • Right Hip - Follow-up, Pain       Date of Surgery: 2018 when she had a right total hip arthroplasty.  ?  HPI:   Patient returns today for 2 years follow up of right total hip arthroplasty Incision(s) healed nicely with no signs of infection. Patient reports doing well with no unusual complaints. No fevers, rigors or chills. Appears to be progressing appropriately. Patient is on appropriate anticoagulation.  The patient states that she is not using any narcotic medication for hip pain.  She does have some hip pain and discomfort.  Most of it is over the lateral aspect of the hip.  Some of the symptoms are based over the greater trochanteric bursa.  She does have a history of DVT but there are no recent clinical factors to suggest a DVT.  She did have some pain and discomfort with activities but that does get better with resting and using anti-inflammatory medication.  She is on long-term Eliquis for her DVT.  There is no history of fevers, rigors or chills.  There is no evidence to suggest either an infective pathology or loosening of the components.      Review of Systems   Constitutional: Negative.    HENT: Negative.    Eyes: Negative.    Respiratory: Negative.    Cardiovascular: Negative.    Gastrointestinal: Negative.    Endocrine: Negative.    Genitourinary: Negative.    Musculoskeletal: Positive for arthralgias and gait problem.   Skin: Negative.    Allergic/Immunologic: Negative.    Hematological: Negative.    Psychiatric/Behavioral: Negative.    Physical exam:  Constitutional: Patient is oriented to person, place, and time. Appears well-developed and well-nourished.   HENT:   Head: Normocephalic and atraumatic.   Eyes: Conjunctivae and EOM are normal. Pupils are equal, round, and reactive to light.   Cardiovascular: Normal rate, regular rhythm, normal heart  sounds and intact distal pulses.   Pulmonary/Chest: Effort normal and breath sounds normal.   Musculoskeletal:   See detailed exam below   Neurological: Alert and oriented to person, place, and time. No sensory deficit. Coordination normal.   Skin: Skin is warm and dry. Capillary refill takes less than 2 seconds. No rash noted. No erythema.   Psychiatric: Patient has a normal mood and affect. Her behavior is normal. Judgment and thought content normal.   Nursing note and vitals reviewed.    Ortho Exam:   Right hip. Patient is post op 2 year(s) from total hip arthoplasty, direct anterior. Incision is clean and healing well. Calf is soft and nontender. Homans sign is negative. Skin and soft tissues are appropriate for postoperative status of the patient. Hip flexion is 0-90 degrees, hip abduction is 0-30 degrees. No limb lenth discrepancy. Neurovascular status is intact. Patients gait is significantly improved. The pain relief is extremely dramatic for the patient. Dorsalis pedis and posterior tibial artery pulses palpable. Common peroneal function is well preserved. There is no evidence of cellulitis or erythema or deep seated joint infection.      Diagnostic Studies:  xrays obtained today  right Hip X-Ray  Indication: Evaluation of implant position following total knee arthroplasty.  AP and lateral  Findings: Well-placed implants with a good press-fit profile without any subsidence of the implants.  no bony lesion  Soft tissues within normal limits  within normal limits joint spaces  Hardware appropriately positioned yes      yes prior studies available for comparison.    X-RAY was ordered and reviewed by Sae Hunt MD          Assessment:  Nguyen was seen today for follow-up and pain.    Diagnoses and all orders for this visit:    Hip pain, right  -     XR Hip With or Without Pelvis 2 - 3 View Right  -     Visco Treatment; Future    Status post total hip replacement, right            Plan   · Incision care  · To  use ACE wrap/ANNA stocking  · Continue ice to joint   · Stretching and strengthening exercises of the hip flexors and abductors.  · Calcium and vitamin D for bone health.  · Tablet meloxicam 7.5 mg tab 1 p.o. nightly for pain swelling and discomfort.  · Aggressive ROM  · Falls precautions and dislocation precautions.  · You may now resume any prescription medications you were taking prior to surgery.  Patient will resume taking the Eliquis for DVT prophylaxis.  · Continue with lifelong antibiotic prophylaxis with dental procedures following total joint replacement.  · Follow up in 1 year(s)    Date of encounter: 05/22/2020   Sae Hunt MD